# Patient Record
Sex: FEMALE | Race: WHITE
[De-identification: names, ages, dates, MRNs, and addresses within clinical notes are randomized per-mention and may not be internally consistent; named-entity substitution may affect disease eponyms.]

---

## 2017-06-14 ENCOUNTER — HOSPITAL ENCOUNTER (OUTPATIENT)
Dept: HOSPITAL 41 - JD.SDS | Age: 82
Discharge: HOME | End: 2017-06-14
Attending: SURGERY
Payer: MEDICARE

## 2017-06-14 VITALS — SYSTOLIC BLOOD PRESSURE: 110 MMHG | DIASTOLIC BLOOD PRESSURE: 71 MMHG

## 2017-06-14 DIAGNOSIS — Z79.899: ICD-10-CM

## 2017-06-14 DIAGNOSIS — K64.1: ICD-10-CM

## 2017-06-14 DIAGNOSIS — Z90.10: ICD-10-CM

## 2017-06-14 DIAGNOSIS — Z90.49: ICD-10-CM

## 2017-06-14 DIAGNOSIS — K57.30: ICD-10-CM

## 2017-06-14 DIAGNOSIS — Z12.11: Primary | ICD-10-CM

## 2017-06-14 DIAGNOSIS — Z85.038: ICD-10-CM

## 2017-06-14 DIAGNOSIS — Z90.89: ICD-10-CM

## 2017-06-14 DIAGNOSIS — Z85.3: ICD-10-CM

## 2017-06-14 DIAGNOSIS — I25.10: ICD-10-CM

## 2017-06-14 PROCEDURE — 0DBN8ZZ EXCISION OF SIGMOID COLON, VIA NATURAL OR ARTIFICIAL OPENING ENDOSCOPIC: ICD-10-PCS | Performed by: SURGERY

## 2017-06-14 PROCEDURE — 45331 SIGMOIDOSCOPY AND BIOPSY: CPT

## 2017-06-14 NOTE — PCM.PREANE
Preanesthetic Assessment





- Anesthesia/Transfusion/Family Hx


Anesthesia History: Prior Anesthesia Without Reaction


Family History of Anesthesia Reaction: No


Transfusion History: No Prior Transfusion(s)





- Review of Systems


General: No Symptoms


Pulmonary: No Symptoms


Cardiovascular: No Symptoms


Gastrointestinal: No symptoms


Neurological: No Symptoms


Other: Reports: None





- Physical Assessment


NPO Status Date: 06/13/17


NPO Status Time: 00:00


Pulse: 57


O2 Sat by Pulse Oximetry: 95


Respiratory Rate: 16


Blood Pressure: 116/78


Temperature: 36.7 C


Height: 1.68 m


Weight: 67.132 kg


ASA Class: 2


Mental Status: Alert & Oriented x3


Dentition: Reports: Broken Tooth/Teeth (front top)


Thyro-Mental Finger Breadths: 3


Mouth Opening Finger Breadths: 3


ROM/Head Extension: Limited/Partial


Lungs: Clear to auscultation, Normal respiratory effort


Cardiovascular: Regular Rate, Regular Rhythm, No Murmurs





- Allergies


Allergies/Adverse Reactions: 


 Allergies











Allergy/AdvReac Type Severity Reaction Status Date / Time


 


No Known Allergies Allergy   Verified 06/13/17 15:12














- Blood


Blood Available: No


Product(s) Available: None





- Anesthesia Plan


Pre-Op Medication Ordered: None





- Acknowledgements


Anesthesia Type Planned: MAC


Pt an Appropriate Candidate for the Planned Anesthesia: Yes


Alternatives and Risks of Anesthesia Discussed w Pt/Guardian: Yes


Pt/Guardian Understands and Agrees with Anesthesia Plan: Yes





PreAnesthesia Questionnaire


Cardiovascular History: Reports: CAD


Respiratory History: Reports: None


Gastrointestinal History: Reports: Diverticulosis, Hemorrhoids


Genitourinary History: Reports: Other (See Below)


Other Genitourinary History: hematuria


OB/GYN History: Reports: Other (See Below)


Other OB/BYN History: R masectomy


Musculoskeletal History: Reports: Other (See Below)


Other Musculoskeletal History: osteopenia


Neurological History: Reports: Other (See Below)


Other Neuro History: dizziness with exertion


Psychiatric History: Reports: None


Endocrine/Metabolic History: Reports: None


Hematologic History: Reports: None


Immunologic History: Reports: None


Oncologic (Cancer) History: Reports: Breast, Colon, Other (See Below)


Other Oncologic History: right lumpectomy


Dermatologic History: Reports: None





- Past Surgical History


Head Surgeries/Procedures: Reports: None


HEENT Surgical History: Reports: Cataract Surgery, Tonsillectomy


GI Surgical History: Reports: Appendectomy, Colonoscopy, Other (See Below)


Other GI Surgeries/Procedures: laparoscopic ileocolectomy for colon cancer


Male  Surgical History: Reports: Mastectomy


Musculoskeletal Surgical History: Reports: Other (See Below)


Other Musculoskeletal Surgeries/Procedures:: L hand fracture, closed reduction 

of Right radius fracture


Oncologic Surgical History: Reports: Mastectomy





- SUBSTANCE USE


Smoking Status *Q: Never Smoker


Recreational Drug Use History: No





- HOME MEDS


Home Medications: 


 Home Meds





Vitamin E 400 unit PO DAILY 10/01/16 [History]


Acetaminophen [Tylenol] 650 mg PO Q4H PRN 06/13/17 [History]


Ascorbic Acid [Vitamin C] 1,000 mg PO DAILY 06/13/17 [History]


Calcium Phosphate Trib/Vit D3 [Calcium + Vitamin D3 Gummies] 1 tab PO DAILY 06/ 13/17 [History]


Cholecalciferol (Vitamin D3) [Vitamin D3] 1,000 units PO DAILY 06/13/17 [History

]


Cyclobenzaprine [Flexeril] 10 mg PO TID PRN 06/13/17 [History]











- CURRENT (IN HOUSE) MEDS


Current Meds: 





 Current Medications





Lactated Ringer's (Ringers, Lactated)  1,000 mls @ 125 mls/hr IV ASDIRECTED CLIFTON


Lidocaine/Sodium Bicarbonate (Buffered Lidocaine 1% In Ns 8.4%)  0.25 ml IV 

ONETIME PRN


   PRN Reason: Prior to IV Start


Sodium Chloride (Saline Flush)  10 ml FLUSH ASDIRECTED PRN


   PRN Reason: Keep Vein Open





Discontinued Medications





Fentanyl (Sublimaze) Confirm Administered Dose 100 mcg .ROUTE .STK-MED ONE


   Stop: 06/14/17 07:37


Lidocaine HCl (Xylocaine-Mpf 1%) Confirm Administered Dose 4 mls @ as directed 

.ROUTE .STK-MED ONE


   Stop: 06/14/17 07:37


Propofol (Diprivan  20 Ml) Confirm Administered Dose 200 mg .ROUTE .STK-MED ONE


   Stop: 06/14/17 07:37

## 2017-06-14 NOTE — PCM.HP
H&P History of Present Illness





- General


Date of Service: 06/14/17


Admit Problem/Dx: 


history of colon cancer, need for screening colonoscopy 





Source of Information: Patient


History Limitations: Reports: No Limitations





- History of Present Illness


Initial Comments - Free Text/Narative: 


The patient is a 87-year-old female referred by Dr. Saray Escalera, hx of 

colon cancer, need for colonoscopy. A follow up colonoscopy was recommended by 

Dr. Diehl to occur between 3-4/2017. 





The patient was last evaluated 5/4 in the office.  She denies any changes to 

her health history.  She has not followed up with Dr. Escalera regarding her 

dizziness.  She feels this has been "pretty" good since the last visit. Reports 

only one bout of dizziness that was mild since last visit.  She reports she did 

finish the colonoscopy prep and stools were clear.  Also reports taking an 

"Activate" supplement by [employer] 





The patient denies any constipation/ diarrhea/ hematochezia/ melena/blood on 

tissue paper/hemorrhoids. Has 1 soft, brown, formed, BMs daily. Bowel movements 

are described as regular and easy to pass. No unintentional weight loss. No 

change in stool caliber. No abdominal pain. Denies history of ulcerative 

colitis or Crohn's disease. Denies any family history of inflammatory bowel 

disease or GI cancers. Last colonoscopy was 2015, with Dr. Lucas. "Findings:  

Colon: An anastomosis was visualized near the colon. There was evidence of 

severe diverticulosis in the entire colon. Medium-sized internal hemorrhoids 

were found. The hemorrhoids were not bleeding."


























- Related Data


Allergies/Adverse Reactions: 


 Allergies











Allergy/AdvReac Type Severity Reaction Status Date / Time


 


No Known Allergies Allergy   Verified 06/13/17 15:12











Home Medications: 


 Home Meds





Vitamin E 400 unit PO DAILY 10/01/16 [History]


Acetaminophen [Tylenol] 650 mg PO Q4H PRN 06/13/17 [History]


Ascorbic Acid [Vitamin C] 1,000 mg PO DAILY 06/13/17 [History]


Calcium Phosphate Trib/Vit D3 [Calcium + Vitamin D3 Gummies] 1 tab PO DAILY 06/ 13/17 [History]


Cholecalciferol (Vitamin D3) [Vitamin D3] 1,000 units PO DAILY 06/13/17 [History

]


Cyclobenzaprine [Flexeril] 10 mg PO TID PRN 06/13/17 [History]











Past Medical History


Cardiovascular History: Reports: CAD


Respiratory History: Reports: None


Gastrointestinal History: Reports: Diverticulosis, Hemorrhoids


Genitourinary History: Reports: Other (See Below)


Other Genitourinary History: hematuria


OB/GYN History: Reports: Other (See Below)


Other OB/BYN History: R masectomy


Musculoskeletal History: Reports: Other (See Below)


Other Musculoskeletal History: osteopenia


Neurological History: Reports: Other (See Below)


Other Neuro History: dizziness with exertion


Psychiatric History: Reports: None


Endocrine/Metabolic History: Reports: None


Hematologic History: Reports: None


Immunologic History: Reports: None


Oncologic (Cancer) History: Reports: Breast, Colon, Other (See Below)


Other Oncologic History: right lumpectomy


Dermatologic History: Reports: None





- Past Surgical History


Head Surgeries/Procedures: Reports: None


HEENT Surgical History: Reports: Cataract Surgery, Tonsillectomy


GI Surgical History: Reports: Appendectomy, Colonoscopy, Other (See Below)


Other GI Surgeries/Procedures: laparoscopic ileocolectomy for colon cancer


Male  Surgical History: Reports: Mastectomy


Musculoskeletal Surgical History: Reports: Other (See Below)


Other Musculoskeletal Surgeries/Procedures:: L hand fracture, closed reduction 

of Right radius fracture


Oncologic Surgical History: Reports: Mastectomy





Social & Family History





- Tobacco Use


Smoking Status *Q: Never Smoker





- Caffeine Use


Caffeine Use: Reports: Tea


Other Caffeine Use: 1 cup tea daily





- Recreational Drug Use


Recreational Drug Use: No


Drug Use in Last 12 Months: No





H&P Review of Systems





- Review of Systems:


Review Of Systems: See Below


Free Text/Narrative: 


Denies any exertional chest pain or shortness of breath. Walks the stairs in 

her house for exercise if she cannot go outside, denies shortness of breath or 

chest pain with this. NO history of any easy bleeding. Hx of easy bruising. No 

personal or familial history of clotting or bleeding disorders. No history of 

anesthetic complications. No history of familial anesthetic complications. 





Denies presence/history of chest pain, palpitations, lower extremity edema, 

dyspnea at rest, orthopnea, claudication, wheezing, obstructive sleep apnea, 

chronic cough, upper respiratory symptoms in the last two weeks. No history of 

blood thinner use. No history of anemia. No history of seizure or stroke. 





No history of fever, chills, or nightsweats.





No prior cardiology or pulmonology evaluation





She does have occasional dizziness with exertion. 





Hx of a fall in October of 2016.  Head CT showed nothing acute.  Carotids were 

negative.  





General: Reports: No Symptoms.  Denies: Fever, Chills


HEENT: Reports: No Symptoms


Pulmonary: Reports: No Symptoms.  Denies: Shortness of Breath, Wheezing


Cardiovascular: Reports: No Symptoms.  Denies: Chest Pain, Palpitations


Gastrointestinal: Reports: No Symptoms.  Denies: Abdominal Pain


Genitourinary: Reports: No Symptoms


Musculoskeletal: Reports: No Symptoms


Skin: Reports: No Symptoms


Psychiatric: Reports: No Symptoms


Neurological: Reports: No Symptoms, Dizziness (hx of none presently)


Hematologic/Lymphatic: Reports: No Symptoms


Immunologic: Reports: No Symptoms





Exam





- Exam


Exam: See Below





- Vital Signs


Weight: 67.132 kg





- Exam


General: Alert, Oriented, Cooperative


HEENT: Conjunctiva Clear.  No: Scleral Icterus


Lungs: Clear to Auscultation, Normal Respiratory Effort


Cardiovascular: Regular Rate, Regular Rhythm, Normal S1, Normal S2, Systolic 

Murmur


Abdomen: Soft.  No: Tenderness


Back Exam: Normal Inspection


Extremities: Normal Inspection.  No: Clubbing, Cyanosis, Edema, Increased Warmth


Skin: Warm, Dry, Intact


Neuro Extensive - Mental Status: Alert, Oriented x3, Normal Mood/Affect, Normal 

Cognition, Memory Intact


Psychiatric: Alert, Normal Affect, Normal Mood





*Q Meaningful Use (ADM)





- VTE *Q


VTE Criteria *Q: 








- Stroke *Q


Stroke Criteria *Q: 








- AMI *Q


AMI Criteria *Q: 








- Problem List


(1) History of colon cancer


SNOMED Code(s): 262679474


   ICD Code: Z85.038 - PERSONAL HISTORY OF MALIGNANT NEOPLASM OF LARGE 

INTESTINE   Status: Acute   Current Visit: Yes   


Problem List Initiated/Reviewed/Updated: Yes


Orders Last 24hrs: 


 Active Orders 24 hr











 Category Date Time Status


 


 Peripheral IV Care [RC] .AS DIRECTED Care  06/14/17 07:00 Active


 


 Verify Patient Consent Obtain [RC] ASDIRECTED Care  06/14/17 07:00 Active


 


 Lactated Ringers [Ringers, Lactated] 1,000 ml Med  06/14/17 07:00 Active





 IV ASDIRECTED   


 


 Lidocaine 1%/Sod Bicarbonate [Buffered Lidocaine 1% in Med  06/14/17 07:00 

Active





 NS 8.4%]   





 0.25 ml IV ONETIME PRN   


 


 Sodium Chloride 0.9% [Saline Flush] Med  06/14/17 07:00 Active





 10 ml FLUSH ASDIRECTED PRN   


 


 Medication Administration Instruction [OM.PC] Routine Oth  06/14/17 07:00 

Ordered


 


 Peripheral IV Insertion Adult [OM.PC] Routine Oth  06/14/17 07:00 Ordered








 Medication Orders





Lactated Ringer's (Ringers, Lactated)  1,000 mls @ 125 mls/hr IV ASDIRECTED CL[facility]ON


Lidocaine/Sodium Bicarbonate (Buffered Lidocaine 1% In Ns 8.4%)  0.25 ml IV 

ONETIME PRN


   PRN Reason: Prior to IV Start


Sodium Chloride (Saline Flush)  10 ml FLUSH ASDIRECTED PRN


   PRN Reason: Keep Vein Open








Assessment/Plan Comment:: 


87yr female with history of colon cancer, need for screening colonoscopy 





Patient can perform 4 METS of physical activity without chest pain or shortness 

of breath. 





Hx of dizziness intermittently with exertion





PLAN: 


We discussed performing a screening colonoscopy. We discussed the risks and 

benefits of the procedure, including, pain, bleeding, infection, damage to 

surrounding structures, need for additional procedures, patient agreed to 

proceed.  





Again discussed I would like her to follow up with PCP earlier than September 

for dizziness.  She also does have a murmur, I found no hx of echocardiogram.  

Consider echocardiogram for further evaluation.  





This patient was evaluated with Dr. Estrellita Lucas, plan formulated by Dr. Estrellita Lucas. 





JANNY Underwood scribing for Dr. Estrellita Lucas

## 2017-06-14 NOTE — PCM.OPNOTE
- General Post-Op/Procedure Note


Date of Surgery/Procedure: 06/14/17


Operative Procedure(s): Sigmoidoscopy with cold forceps polypectomy


Pre Op Diagnosis: Personal history of colon cancer, status post resection in 

2012


Post-Op Diagnosis: Severe diverticulosis, diminutive sigmoid colon polyp, 

inability pass the scope past 60 cm, internal hemorrhoids (Grade II)


Anesthesia Technique: MAC


Primary Surgeon: Estrellita Lucas


Anesthesia Provider: Ulises Earl


Pathology: 





1. Sigmoid colon polyp 


Fluid Replacement, Intraop: 600 (mL crystalloid )


EBL in mLs: 1


Complications: None


Condition: Good


Free Text/Narrative:: 








INDICATION FOR PROCEDURE: The patient is a 87-year-old woman who was referred 

to me by Dr. Low Diehl for evaluation for colonoscopy.  Her primary care 

providers Dr. Saray Escalera. She is also now following with Dr. shreya hanks for oncology. Her last colonoscopy was performed by me in February 2016. 

Her colon was difficult to navigate at that time, but a complete colonoscopy to 

the anastomosis was performed with evidence of severe diverticulosis. She does 

have a personal history of right colon cancer and is s/p resection in 2012. 

Performing a surveillance colonoscopy and the associated risks of the procedure 

had been discussed with the patient.  The patient found these risks acceptable 

and agreed to proceed.





DESCRIPTION OF PROCEDURE:  The patient was taken to the operating room and 

placed in left lateral decubitus position.  After induction of adequate sedation

, a digital rectal exam was performed which was unremarkable.  A pediatric 

Olympus colonoscope was inserted into the rectum and guided under direct 

visualization to 60 cm. The scope was not able to be advanced past this point. 

There was not any significant looping of the scope or obvious colonic 

stricture. Adhesions or tortuosity related to her diverticular disease were 

felt to be the causes of the inability to advance further. Further pressure was 

not used to push past the stricture due to her age and increased risk for 

perforation. The scope was then slowly withdrawn through the colon. The quality 

of the prep was good.  There was no evidence of angiodysplasias. Severe 

diverticulosis was noted in the sigmoid colon. A diminutive sessile colon polyp 

was removed with cold forceps.  The scope was withdrawn into the rectum and 

retroflexed.  There were Grade II internal hemorrhoids.  The scope was 

straightened, the colon was desufflated, and the scope was withdrawn.  The 

patient was awakened from sedation and transferred to the recovery room in 

stable condition having tolerated the procedure well.





POSTOPERATIVE PLAN: I discussed with the patient's daughter via telephone my 

intraoperative findings. Cologuard could be considered, or another attempted 

colonoscopy in the future vs. a barium enema. We will discuss options with the 

patient when she presents for follow up.

## 2018-09-24 ENCOUNTER — HOSPITAL ENCOUNTER (EMERGENCY)
Dept: HOSPITAL 41 - JD.ED | Age: 83
Discharge: HOME | End: 2018-09-24
Payer: MEDICARE

## 2018-09-24 VITALS — SYSTOLIC BLOOD PRESSURE: 125 MMHG | DIASTOLIC BLOOD PRESSURE: 44 MMHG

## 2018-09-24 DIAGNOSIS — M54.41: Primary | ICD-10-CM

## 2018-09-24 PROCEDURE — 36415 COLL VENOUS BLD VENIPUNCTURE: CPT

## 2018-09-24 PROCEDURE — 99283 EMERGENCY DEPT VISIT LOW MDM: CPT

## 2018-09-24 PROCEDURE — 80053 COMPREHEN METABOLIC PANEL: CPT

## 2018-09-24 PROCEDURE — 85025 COMPLETE CBC W/AUTO DIFF WBC: CPT

## 2018-09-24 PROCEDURE — 73502 X-RAY EXAM HIP UNI 2-3 VIEWS: CPT

## 2018-09-24 NOTE — CR
Pelvis and right hip: AP view of the pelvis was obtained as well as AP

 and frog-leg lateral views of the right hip.

 

Joint space narrowing is seen within both hips.  Osteophytes are noted

 off of both femoral heads.  Cystic change is also seen within both 

femoral heads which is degenerative in etiology.  Surgical material is

 seen within the right abdomen.  Osteopenia is seen.  No fracture or 

other abnormality is noted.

 

Impression:

1.  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

2.  Osteopenia.

3.  Nothing acute is seen on AP pelvis or on two-view right hip exam.

 

Diagnostic code #2

## 2018-09-24 NOTE — EDM.PDOC
ED HPI GENERAL MEDICAL PROBLEM





- General


Chief Complaint: Back Pain or Injury


Stated Complaint: BACK PAIN


Time Seen by Provider: 09/24/18 09:15


Source of Information: Reports: Patient, RN Notes Reviewed





- History of Present Illness


INITIAL COMMENTS - FREE TEXT/NARRATIVE: 





88-year-old lady comes in with right low back pain.  She's been having 

difficulty with right low back pain for several months. She was prescribed 

medication at the clinic about 2 weeks ago that was giving her moderate relief 

but now she has run out. She has had prior x-rays, MRI and even neurosurgical 

consultation but has opted not to have surgery. She states she did get an 

injection of low back a few months ago and that did help but no to having quite 

significant discomfort right low back worse with motion with radiation down 

right leg toward the knee. She also has been having hip discomfort with 

radiation of pain towards the groin with weightbearing. No voiding symptoms. No 

recent fever or chills. No recent fall or injury.


  ** Lower Back


Pain Score (Numeric/FACES): 8





- Related Data


 Allergies











Allergy/AdvReac Type Severity Reaction Status Date / Time


 


No Known Allergies Allergy   Verified 09/24/18 09:10











Home Meds: 


 Home Meds





Vitamin E 400 unit PO DAILY 10/01/16 [History]


Acetaminophen [Tylenol] 650 mg PO Q4H PRN 06/13/17 [History]


Ascorbic Acid [Vitamin C] 1,000 mg PO DAILY 06/13/17 [History]


Calcium Phosphate Trib/Vit D3 [Calcium + Vitamin D3 Gummies] 1 tab PO DAILY 06/ 13/17 [History]


Cholecalciferol (Vitamin D3) [Vitamin D3] 1,000 units PO DAILY 06/13/17 [History

]


Tolterodine Tartrate [Tolterodine Tartrate ER] 2 mg PO DAILY 09/24/18 [History]


traMADol [Ultram] 50 mg PO Q8HR PRN #30 tablet 09/24/18 [Rx]











Past Medical History


Cardiovascular History: Reports: CAD


Respiratory History: Reports: None


Gastrointestinal History: Reports: Diverticulosis, Hemorrhoids


Genitourinary History: Reports: Other (See Below)


Other Genitourinary History: hematuria


OB/GYN History: Reports: Other (See Below)


Other OB/GYN History: R masectomy


Musculoskeletal History: Reports: Other (See Below)


Other Musculoskeletal History: osteopenia


Neurological History: Reports: Other (See Below)


Other Neuro History: dizziness with exertion


Psychiatric History: Reports: None


Endocrine/Metabolic History: Reports: None


Hematologic History: Reports: None


Immunologic History: Reports: None


Oncologic (Cancer) History: Reports: Breast, Colon, Other (See Below)


Other Oncologic History: right lumpectomy


Dermatologic History: Reports: None





- Past Surgical History


Head Surgeries/Procedures: Reports: None


HEENT Surgical History: Reports: Cataract Surgery, Tonsillectomy


GI Surgical History: Reports: Appendectomy, Colonoscopy, Other (See Below)


Other GI Surgeries/Procedures: laparoscopic ileocolectomy for colon cancer


Male  Surgical History: Reports: Mastectomy


Musculoskeletal Surgical History: Reports: Other (See Below)


Other Musculoskeletal Surgeries/Procedures:: L hand fracture, closed reduction 

of Right radius fracture


Oncologic Surgical History: Reports: Mastectomy





Social & Family History





- Caffeine Use


Caffeine Use: Reports: Tea


Other Caffeine Use: 1 cup tea daily





ED ROS GENERAL





- Review of Systems


Review Of Systems: See Below


Constitutional: Denies: Fever, Chills, Diaphoresis


HEENT: Reports: No Symptoms


Respiratory: Denies: Shortness of Breath


Cardiovascular: Denies: Chest Pain


GI/Abdominal: Denies: Abdominal Pain, Nausea, Vomiting


Musculoskeletal: Reports: Back Pain


Skin: Denies: Bruising, Rash


Neurological: Reports: Difficulty Walking.  Denies: Numbness, Tingling





ED EXAM,LOWER BACK PAIN/INJURY





- Physical Exam


Exam: See Below


General Appearance: Alert, No Apparent Distress (While at rest)


Throat/Mouth: Normal Inspection


Head: Atraumatic


Neck: Supple, Full Range of Motion


Respiratory/Chest: No Respiratory Distress, Lungs Clear, Normal Breath Sounds


Cardiovascular: Regular Rate, Rhythm


GI/Abdominal: Soft, Non-Tender


Back Exam: Other (There is mild tenderness bilateral lower back, no bruising or 

swelling visible)


Extremities: Other (Right hip is nontender,)


Neurological: No Motor/Sensory Deficits


Skin Exam: Warm, Dry, Normal Color, No Rash





Course





- Vital Signs


Last Recorded V/S: 


 Last Vital Signs











Temp  97.5 F   09/24/18 09:05


 


Pulse  72   09/24/18 09:05


 


Resp  18   09/24/18 09:05


 


BP  125/44 L  09/24/18 09:05


 


Pulse Ox  100   09/24/18 09:05














- Orders/Labs/Meds


Labs: 


 Laboratory Tests











  09/24/18 09/24/18 Range/Units





  09:47 09:47 


 


WBC  5.46   (3.98-10.04)  K/mm3


 


RBC  3.72 L   (3.98-5.22)  M/mm3


 


Hgb  11.7   (11.2-15.7)  gm/L


 


Hct  36.6   (34.1-44.9)  %


 


MCV  98.4 H   (79.4-94.8)  fl


 


MCH  31.5   (25.6-32.2)  pg


 


MCHC  32.0 L   (32.2-35.5)  g/dl


 


RDW Std Deviation  55.0 H   (36.4-46.3)  fL


 


Plt Count  365   (182-369)  K/mm3


 


MPV  8.8 L   (9.4-12.3)  fl


 


Neut % (Auto)  68.5   (34.0-71.1)  %


 


Lymph % (Auto)  21.4   (19.3-51.7)  %


 


Mono % (Auto)  5.7   (4.7-12.5)  %


 


Eos % (Auto)  3.7   (0.7-5.8)  


 


Baso % (Auto)  0.5   (0.1-1.2)  %


 


Neut # (Auto)  3.74   (1.56-6.13)  K/mm3


 


Lymph # (Auto)  1.17 L   (1.18-3.74)  K/mm3


 


Mono # (Auto)  0.31   (0.24-0.36)  K/mm3


 


Eos # (Auto)  0.20   (0.04-0.36)  K/mm3


 


Baso # (Auto)  0.03   (0.01-0.08)  K/mm3


 


Sodium   141  (136-145)  mEq/L


 


Potassium   3.6  (3.5-5.1)  mEq/L


 


Chloride   105  ()  mEq/L


 


Carbon Dioxide   25  (21-32)  mEq/L


 


Anion Gap   14.6  (5-15)  


 


BUN   13  (7-18)  mg/dL


 


Creatinine   1.1 H  (0.55-1.02)  mg/dL


 


Est Cr Clr Drug Dosing   TNP  


 


Estimated GFR (MDRD)   47  (>60)  mL/min


 


BUN/Creatinine Ratio   11.8 L  (14-18)  


 


Glucose   79 L  ()  mg/dL


 


Calcium   8.7  (8.5-10.1)  mg/dL


 


Total Bilirubin   0.2  (0.2-1.0)  mg/dL


 


AST   37  (15-37)  U/L


 


ALT   19  (14-59)  U/L


 


Alkaline Phosphatase   114  ()  U/L


 


Total Protein   6.6  (6.4-8.2)  g/dl


 


Albumin   3.1 L  (3.4-5.0)  g/dl


 


Globulin   3.5  gm/dL


 


Albumin/Globulin Ratio   0.9 L  (1-2)  











Meds: 


Medications














Discontinued Medications














Generic Name Dose Route Start Last Admin





  Trade Name Dwayneq  PRN Reason Stop Dose Admin


 


Acetaminophen  975 mg  09/24/18 09:35  09/24/18 09:40





  Tylenol  PO  09/24/18 09:36  975 mg





  NOW ONE   Administration





     





     





     





     


 


Tramadol HCl  50 mg  09/24/18 09:35  09/24/18 09:42





  Ultram  PO  09/24/18 09:36  50 mg





  ONETIME ONE   Administration





     





     





     





     














- Re-Assessments/Exams


Free Text/Narrative Re-Assessment/Exam: 





09/24/18 15:02


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 

fracture. She has been given Tylenol and tramadol and that is giving her some 

relief. We'll also put her on low-dose prednisone for about 6 days, see how 

that works for her. Discharge instructions as documented.





Departure





- Departure


Time of Disposition: 11:02


Disposition: Home, Self-Care 01


Condition: Fair


Clinical Impression: 


Back pain


Qualifiers:


 Back pain location: low back pain Chronicity: chronic Back pain laterality: 

right Sciatica presence: with sciatica Sciatica laterality: sciatica of right 

side Qualified Code(s): M54.41 - Lumbago with sciatica, right side








- Discharge Information


Prescriptions: 


traMADol [Ultram] 50 mg PO Q8HR PRN #30 tablet


 PRN Reason: Pain


Instructions:  Back Pain, Adult, Easy-to-Read


Referrals: 


Saray Escalera MD [Primary Care Provider] - 


Forms:  ED Department Discharge


Additional Instructions: 


You may take tramadol 50 mg up to 3 times daily for low back discomfort, 

continue to take Tylenol or Excedrin in between doses also 2-3 times daily for 

extra pain relief, alternate ice and heat as needed, physical therapy, 

prednisone 20 mg today and then every morning for the next 5 days.  See Dr. Escalera in about 2-3 weeks as planned.